# Patient Record
Sex: FEMALE | Race: BLACK OR AFRICAN AMERICAN | Employment: OTHER | URBAN - METROPOLITAN AREA
[De-identification: names, ages, dates, MRNs, and addresses within clinical notes are randomized per-mention and may not be internally consistent; named-entity substitution may affect disease eponyms.]

---

## 2018-03-09 ENCOUNTER — HOSPITAL ENCOUNTER (EMERGENCY)
Age: 63
Discharge: HOME | End: 2018-03-09
Attending: EMERGENCY MEDICINE
Payer: COMMERCIAL

## 2018-03-09 VITALS
WEIGHT: 164 LBS | SYSTOLIC BLOOD PRESSURE: 159 MMHG | BODY MASS INDEX: 26.36 KG/M2 | HEIGHT: 66 IN | TEMPERATURE: 97.9 F | OXYGEN SATURATION: 100 % | RESPIRATION RATE: 15 BRPM | DIASTOLIC BLOOD PRESSURE: 93 MMHG | HEART RATE: 87 BPM

## 2018-03-09 DIAGNOSIS — I10 UNCONTROLLED HYPERTENSION: Primary | ICD-10-CM

## 2018-03-09 DIAGNOSIS — R53.1 WEAKNESS: ICD-10-CM

## 2018-03-09 LAB
ANION GAP SERPL CALC-SCNC: 6 MMOL/L (ref 3–18)
ATRIAL RATE: 68 BPM
BASOPHILS # BLD: 0 K/UL (ref 0–0.06)
BASOPHILS NFR BLD: 1 % (ref 0–2)
BUN SERPL-MCNC: 13 MG/DL (ref 7–18)
BUN/CREAT SERPL: 18 (ref 12–20)
CALCIUM SERPL-MCNC: 9.2 MG/DL (ref 8.5–10.1)
CALCULATED P AXIS, ECG09: 40 DEGREES
CALCULATED R AXIS, ECG10: 1 DEGREES
CALCULATED T AXIS, ECG11: 53 DEGREES
CHLORIDE SERPL-SCNC: 105 MMOL/L (ref 100–108)
CO2 SERPL-SCNC: 29 MMOL/L (ref 21–32)
CREAT SERPL-MCNC: 0.73 MG/DL (ref 0.6–1.3)
DIAGNOSIS, 93000: NORMAL
DIFFERENTIAL METHOD BLD: ABNORMAL
EOSINOPHIL # BLD: 0.2 K/UL (ref 0–0.4)
EOSINOPHIL NFR BLD: 4 % (ref 0–5)
ERYTHROCYTE [DISTWIDTH] IN BLOOD BY AUTOMATED COUNT: 13.6 % (ref 11.6–14.5)
GLUCOSE SERPL-MCNC: 83 MG/DL (ref 74–99)
HCT VFR BLD AUTO: 42.4 % (ref 35–45)
HGB BLD-MCNC: 13.9 G/DL (ref 12–16)
LYMPHOCYTES # BLD: 1.9 K/UL (ref 0.9–3.6)
LYMPHOCYTES NFR BLD: 30 % (ref 21–52)
MCH RBC QN AUTO: 27.7 PG (ref 24–34)
MCHC RBC AUTO-ENTMCNC: 32.8 G/DL (ref 31–37)
MCV RBC AUTO: 84.6 FL (ref 74–97)
MONOCYTES # BLD: 0.7 K/UL (ref 0.05–1.2)
MONOCYTES NFR BLD: 11 % (ref 3–10)
NEUTS SEG # BLD: 3.5 K/UL (ref 1.8–8)
NEUTS SEG NFR BLD: 54 % (ref 40–73)
P-R INTERVAL, ECG05: 158 MS
PLATELET # BLD AUTO: 344 K/UL (ref 135–420)
PMV BLD AUTO: 9.2 FL (ref 9.2–11.8)
POTASSIUM SERPL-SCNC: 3.4 MMOL/L (ref 3.5–5.5)
Q-T INTERVAL, ECG07: 408 MS
QRS DURATION, ECG06: 86 MS
QTC CALCULATION (BEZET), ECG08: 433 MS
RBC # BLD AUTO: 5.01 M/UL (ref 4.2–5.3)
SODIUM SERPL-SCNC: 140 MMOL/L (ref 136–145)
TROPONIN I SERPL-MCNC: <0.02 NG/ML (ref 0–0.04)
VENTRICULAR RATE, ECG03: 68 BPM
WBC # BLD AUTO: 6.4 K/UL (ref 4.6–13.2)

## 2018-03-09 PROCEDURE — 84484 ASSAY OF TROPONIN QUANT: CPT | Performed by: EMERGENCY MEDICINE

## 2018-03-09 PROCEDURE — 85025 COMPLETE CBC W/AUTO DIFF WBC: CPT | Performed by: EMERGENCY MEDICINE

## 2018-03-09 PROCEDURE — 80048 BASIC METABOLIC PNL TOTAL CA: CPT | Performed by: EMERGENCY MEDICINE

## 2018-03-09 PROCEDURE — 93005 ELECTROCARDIOGRAM TRACING: CPT

## 2018-03-09 PROCEDURE — 99283 EMERGENCY DEPT VISIT LOW MDM: CPT

## 2018-03-09 RX ORDER — CHLORTHALIDONE 25 MG/1
TABLET ORAL DAILY
COMMUNITY

## 2018-03-09 NOTE — DISCHARGE INSTRUCTIONS
High Blood Pressure: Care Instructions  Your Care Instructions    If your blood pressure is usually above 140/90, you have high blood pressure, or hypertension. That means the top number is 140 or higher or the bottom number is 90 or higher, or both. Despite what a lot of people think, high blood pressure usually doesn't cause headaches or make you feel dizzy or lightheaded. It usually has no symptoms. But it does increase your risk for heart attack, stroke, and kidney or eye damage. The higher your blood pressure, the more your risk increases. Your doctor will give you a goal for your blood pressure. Your goal will be based on your health and your age. An example of a goal is to keep your blood pressure below 140/90. Lifestyle changes, such as eating healthy and being active, are always important to help lower blood pressure. You might also take medicine to reach your blood pressure goal.  Follow-up care is a key part of your treatment and safety. Be sure to make and go to all appointments, and call your doctor if you are having problems. It's also a good idea to know your test results and keep a list of the medicines you take. How can you care for yourself at home? Medical treatment  · If you stop taking your medicine, your blood pressure will go back up. You may take one or more types of medicine to lower your blood pressure. Be safe with medicines. Take your medicine exactly as prescribed. Call your doctor if you think you are having a problem with your medicine. · Talk to your doctor before you start taking aspirin every day. Aspirin can help certain people lower their risk of a heart attack or stroke. But taking aspirin isn't right for everyone, because it can cause serious bleeding. · See your doctor regularly. You may need to see the doctor more often at first or until your blood pressure comes down.   · If you are taking blood pressure medicine, talk to your doctor before you take decongestants or anti-inflammatory medicine, such as ibuprofen. Some of these medicines can raise blood pressure. · Learn how to check your blood pressure at home. Lifestyle changes  · Stay at a healthy weight. This is especially important if you put on weight around the waist. Losing even 10 pounds can help you lower your blood pressure. · If your doctor recommends it, get more exercise. Walking is a good choice. Bit by bit, increase the amount you walk every day. Try for at least 30 minutes on most days of the week. You also may want to swim, bike, or do other activities. · Avoid or limit alcohol. Talk to your doctor about whether you can drink any alcohol. · Try to limit how much sodium you eat to less than 2,300 milligrams (mg) a day. Your doctor may ask you to try to eat less than 1,500 mg a day. · Eat plenty of fruits (such as bananas and oranges), vegetables, legumes, whole grains, and low-fat dairy products. · Lower the amount of saturated fat in your diet. Saturated fat is found in animal products such as milk, cheese, and meat. Limiting these foods may help you lose weight and also lower your risk for heart disease. · Do not smoke. Smoking increases your risk for heart attack and stroke. If you need help quitting, talk to your doctor about stop-smoking programs and medicines. These can increase your chances of quitting for good. When should you call for help? Call 911 anytime you think you may need emergency care. This may mean having symptoms that suggest that your blood pressure is causing a serious heart or blood vessel problem. Your blood pressure may be over 180/110. ? For example, call 911 if:  ? · You have symptoms of a heart attack. These may include:  ¨ Chest pain or pressure, or a strange feeling in the chest.  ¨ Sweating. ¨ Shortness of breath. ¨ Nausea or vomiting.   ¨ Pain, pressure, or a strange feeling in the back, neck, jaw, or upper belly or in one or both shoulders or arms.  ¨ Lightheadedness or sudden weakness. ¨ A fast or irregular heartbeat. ? · You have symptoms of a stroke. These may include:  ¨ Sudden numbness, tingling, weakness, or loss of movement in your face, arm, or leg, especially on only one side of your body. ¨ Sudden vision changes. ¨ Sudden trouble speaking. ¨ Sudden confusion or trouble understanding simple statements. ¨ Sudden problems with walking or balance. ¨ A sudden, severe headache that is different from past headaches. ? · You have severe back or belly pain. ?Do not wait until your blood pressure comes down on its own. Get help right away. ?Call your doctor now or seek immediate care if:  ? · Your blood pressure is much higher than normal (such as 180/110 or higher), but you don't have symptoms. ? · You think high blood pressure is causing symptoms, such as:  ¨ Severe headache. ¨ Blurry vision. ? Watch closely for changes in your health, and be sure to contact your doctor if:  ? · Your blood pressure measures 140/90 or higher at least 2 times. That means the top number is 140 or higher or the bottom number is 90 or higher, or both. ? · You think you may be having side effects from your blood pressure medicine. ? · Your blood pressure is usually normal, but it goes above normal at least 2 times. Where can you learn more? Go to http://andrey-hamlet.info/. Enter L933 in the search box to learn more about \"High Blood Pressure: Care Instructions. \"  Current as of: September 21, 2016  Content Version: 11.4  © 6540-6486 [employer]. Care instructions adapted under license by [employer] (which disclaims liability or warranty for this information). If you have questions about a medical condition or this instruction, always ask your healthcare professional. [place] [address] any warranty or liability for your use of this information.

## 2018-03-09 NOTE — ED TRIAGE NOTES
Brought in by school nurse. Non compliant in taking BP meds. Headache in back of head, feet tingling, dizzy. Took BP meds prior to arrivel in ED.

## 2018-03-09 NOTE — ED PROVIDER NOTES
EMERGENCY DEPARTMENT HISTORY AND PHYSICAL EXAM    11:50 AM      Date: 3/9/2018  Patient Name: Gatito Morris    History of Presenting Illness     Chief Complaint   Patient presents with    Hypertension         History Provided By: Patient, Patient's co-worker    Chief Complaint: Dizziness, HA, generalized weakness  Duration: This morning  Timing:  Acute  Location: head  Quality: N/A  Severity: N/A  Modifying Factors: Mild relief to dizziness with rest  Associated Symptoms: Exhibits HA, pain in the back of her neck, tingling in bilateral feet, and pain in bilateral hands; Denies abdominal pain, nausea, emesis, diaphoresis, diarrhea, and dysuria. Additional History (Context): Gatito Morris is a 58 y.o. female presents c/o dizziness, HA, and generalized weakness onset this morning. 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 Pt was able to ambulate on her own to the nurses office and found relief to her sx after sitting down in the office for a few minutes. Pt was accompanied to the ED by the school nurse, who stated the pt's blood pressure in her office was 200/102. Pt took her HTN medication in the presence of the school nurse after this reading. Pt notes she was only able to eat a bite of a donut this morning but became nauseous so she threw it away. Pt had an episode similar to this five days ago that was relieved by sleeping. Pt is experienced HA, pain in the back of her neck, tingling in bilateral feet, and pain in bilateral hands at the onset of her dizziness however she denies abdominal pain, nausea, emesis, diaphoresis, diarrhea, and dysuria. PCP: Eleonora Cheney MD    Current Outpatient Prescriptions   Medication Sig Dispense Refill    chlorthalidone (HYGROTEN) 25 mg tablet Take  by mouth daily.  omeprazole (PRILOSEC) 10 mg capsule Take 10 mg by mouth daily.         levothyroxine (SYNTHROID) 88 mcg tablet Take  by mouth Daily (before breakfast).  ergocalciferol (VITAMIN D2) 50,000 unit capsule Take 50,000 Units by mouth.  omega-3 fatty acids-vitamin e (FISH OIL) 1,000 mg Cap Take 1 Cap by mouth.  IBUPROFEN (ADVIL PO) Take  by mouth. Past History     Past Medical History:  Past Medical History:   Diagnosis Date    Abdominal pain     Gall stones     GERD (gastroesophageal reflux disease)     Hypertension     Thyroid disease        Past Surgical History:  Past Surgical History:   Procedure Laterality Date    HX THYROIDECTOMY         Family History:  Family History   Problem Relation Age of Onset    Diabetes Mother     Hypertension Mother     Glaucoma Mother     Neuropathy Mother     Heart Disease Mother     Cancer Father     Lung Disease Father        Social History:  Social History   Substance Use Topics    Smoking status: Never Smoker    Smokeless tobacco: Never Used    Alcohol use Yes      Comment: socially       Allergies: Allergies   Allergen Reactions    Pcn [Penicillins] Anaphylaxis    Ibuprofen Other (comments)     Stomach upset    Mold Extracts Other (comments)    Asa-Acetaminophen-Salicyl-Caff Nausea Only         Review of Systems     Review of Systems   Constitutional: Negative for diaphoresis and fever. HENT: Negative for congestion and sore throat. Eyes: Negative for pain and itching. Respiratory: Negative for cough and shortness of breath. Cardiovascular: Negative for chest pain and palpitations. Gastrointestinal: Negative for abdominal pain, diarrhea, nausea and vomiting. Endocrine: Negative for polydipsia and polyuria. Genitourinary: Negative for dysuria and hematuria. Musculoskeletal: Positive for neck pain. Negative for arthralgias and myalgias. Skin: Negative for rash and wound. Neurological: Positive for dizziness, weakness, numbness (bilateral feet) and headaches. Negative for seizures and syncope.    Hematological: Does not bruise/bleed easily. Psychiatric/Behavioral: Negative for agitation and hallucinations. Physical Exam     Visit Vitals    BP (!) 159/93    Pulse 87    Temp 97.9 °F (36.6 °C)    Resp 15    Ht 5' 6\" (1.676 m)    Wt 74.4 kg (164 lb)    SpO2 100%    BMI 26.47 kg/m2       Physical Exam   Constitutional: She is oriented to person, place, and time. She appears well-developed and well-nourished. HENT:   Head: Normocephalic and atraumatic. Eyes: Conjunctivae are normal. No scleral icterus. Neck: Normal range of motion. Neck supple. No JVD present. Cardiovascular: Normal rate, regular rhythm and normal heart sounds. 4 intact extremity pulses   Pulmonary/Chest: Effort normal and breath sounds normal.   Abdominal: Soft. She exhibits no mass. There is no tenderness. Musculoskeletal: Normal range of motion. Lymphadenopathy:     She has no cervical adenopathy. Neurological: She is alert and oriented to person, place, and time. No cranial nerve deficit. Coordination normal.   Skin: Skin is warm and dry. Nursing note and vitals reviewed. Diagnostic Study Results     Labs -  Recent Results (from the past 12 hour(s))   CBC WITH AUTOMATED DIFF    Collection Time: 03/09/18 11:40 AM   Result Value Ref Range    WBC 6.4 4.6 - 13.2 K/uL    RBC 5.01 4.20 - 5.30 M/uL    HGB 13.9 12.0 - 16.0 g/dL    HCT 42.4 35.0 - 45.0 %    MCV 84.6 74.0 - 97.0 FL    MCH 27.7 24.0 - 34.0 PG    MCHC 32.8 31.0 - 37.0 g/dL    RDW 13.6 11.6 - 14.5 %    PLATELET 015 149 - 755 K/uL    MPV 9.2 9.2 - 11.8 FL    NEUTROPHILS 54 40 - 73 %    LYMPHOCYTES 30 21 - 52 %    MONOCYTES 11 (H) 3 - 10 %    EOSINOPHILS 4 0 - 5 %    BASOPHILS 1 0 - 2 %    ABS. NEUTROPHILS 3.5 1.8 - 8.0 K/UL    ABS. LYMPHOCYTES 1.9 0.9 - 3.6 K/UL    ABS. MONOCYTES 0.7 0.05 - 1.2 K/UL    ABS. EOSINOPHILS 0.2 0.0 - 0.4 K/UL    ABS.  BASOPHILS 0.0 0.0 - 0.06 K/UL    DF AUTOMATED     METABOLIC PANEL, BASIC    Collection Time: 03/09/18 11:40 AM   Result Value Ref Range    Sodium 140 136 - 145 mmol/L    Potassium 3.4 (L) 3.5 - 5.5 mmol/L    Chloride 105 100 - 108 mmol/L    CO2 29 21 - 32 mmol/L    Anion gap 6 3.0 - 18 mmol/L    Glucose 83 74 - 99 mg/dL    BUN 13 7.0 - 18 MG/DL    Creatinine 0.73 0.6 - 1.3 MG/DL    BUN/Creatinine ratio 18 12 - 20      GFR est AA >60 >60 ml/min/1.73m2    GFR est non-AA >60 >60 ml/min/1.73m2    Calcium 9.2 8.5 - 10.1 MG/DL   TROPONIN I    Collection Time: 03/09/18 11:40 AM   Result Value Ref Range    Troponin-I, Qt. <0.02 0.0 - 0.045 NG/ML   EKG, 12 LEAD, INITIAL    Collection Time: 03/09/18 12:05 PM   Result Value Ref Range    Ventricular Rate 68 BPM    Atrial Rate 68 BPM    P-R Interval 158 ms    QRS Duration 86 ms    Q-T Interval 408 ms    QTC Calculation (Bezet) 433 ms    Calculated P Axis 40 degrees    Calculated R Axis 1 degrees    Calculated T Axis 53 degrees    Diagnosis       Normal sinus rhythm  Minimal voltage criteria for LVH, may be normal variant  Possible Anterior infarct , age undetermined  Abnormal ECG  When compared with ECG of 21-JUN-2012 19:02,  No significant change was found         Radiologic Studies -   No orders to display     No results found. Medical Decision Making   Initial Medical Decision Making and DDx:  Pt has lightheadedness and generalized weakness. Not stroke. Unlikely ACS, anemia, renal failure, electrolyte abnormality. Does have uncontrolled HTN without hypertensive emergency. Anticipate discharge. Discussed need to take medication and discuss with PCP. 12:54 PM: Pt has been reassessed. Feels better. Blood pressure has improved. No anemia, ACS, renal failure, or electrolyte abnormality. Instructed to follow up with PCP and return to ED with concerning sx. All questions answered. Happy with plan for discharge. ED Course: Progress Notes, Reevaluation, and Consults:    I am the first provider for this patient.     I reviewed the vital signs, available nursing notes, past medical history, past surgical history, family history and social history. Vital Signs-Reviewed the patient's vital signs. Pulse Oximetry Analysis - 97% on roomair    Cardiac Monitor:  Rate/Rhythm:  NSR at 87 bpm    EKG: Interpreted by the EP. Time Interpreted: 12:05 pm   Rate: 68   Rhythm: Normal Sinus rhythm   Interpretation:no acute process    Records Reviewed: Nursing Notes and Old Medical Records (Time of Review: 11:50 AM)    Diagnosis     Clinical Impression:   1. Uncontrolled hypertension    2. Weakness        Disposition: Discharge    Follow-up Information     Follow up With Details Comments [address], [place] In 2 days  [address]  [phone]             Patient's Medications   Start Taking    No medications on file   Continue Taking    CHLORTHALIDONE (HYGROTEN) 25 MG TABLET    Take  by mouth daily. ERGOCALCIFEROL (VITAMIN D2) 50,000 UNIT CAPSULE    Take 50,000 Units by mouth. IBUPROFEN (ADVIL PO)    Take  by mouth. LEVOTHYROXINE (SYNTHROID) 88 MCG TABLET    Take  by mouth Daily (before breakfast). OMEGA-3 FATTY ACIDS-VITAMIN E (FISH OIL) 1,000 MG CAP    Take 1 Cap by mouth. OMEPRAZOLE (PRILOSEC) 10 MG CAPSULE    Take 10 mg by mouth daily. These Medications have changed    No medications on file   Stop Taking    CIPROFLOXACIN (CIPRO) 500 MG TABLET    Take 1 Tab by mouth two (2) times a day. HYDROCODONE-ACETAMINOPHEN (NORCO) 5-325 MG PER TABLET    Take 1 Tab by mouth every four (4) hours as needed for Pain.     HYDROCODONE-ACETAMINOPHEN (NORCO) 5-325 MG PER TABLET    1-2 tabs PO q 4 hours prn pain     _______________________________    Attestations:  Scribe [address] [place] [address] acting as a scribe for and in the presence of Manjeet Givens MD      March 09, 2018 at 11:50 AM       Provider Attestation:      I personally performed the services described in the documentation, reviewed the documentation, as recorded by the scribe in my presence, and it accurately and completely records my words and actions.  March 09, 2018 at 11:50 AM - Noris Parker MD    _______________________________